# Patient Record
Sex: FEMALE | Race: WHITE | URBAN - METROPOLITAN AREA
[De-identification: names, ages, dates, MRNs, and addresses within clinical notes are randomized per-mention and may not be internally consistent; named-entity substitution may affect disease eponyms.]

---

## 2017-01-11 ENCOUNTER — TRANSFERRED RECORDS (OUTPATIENT)
Dept: HEALTH INFORMATION MANAGEMENT | Facility: CLINIC | Age: 37
End: 2017-01-11

## 2017-02-09 ENCOUNTER — CARE COORDINATION (OUTPATIENT)
Dept: GASTROENTEROLOGY | Facility: CLINIC | Age: 37
End: 2017-02-09

## 2017-02-09 NOTE — PROGRESS NOTES
Spoke with Jason, reminded her of upcoming appointment with Dr. Jane 2/23.  She is planning to attend.  Number provided if she needs to reschedule.

## 2017-02-22 ENCOUNTER — TELEPHONE (OUTPATIENT)
Dept: GASTROENTEROLOGY | Facility: CLINIC | Age: 37
End: 2017-02-22

## 2017-02-22 NOTE — TELEPHONE ENCOUNTER
Sheina left me a VM stating that she would like to reschedule her appointment with Dr. Jane as her insurance did not authorize the visit in time for her to be seen.   She knows she has been rescheduled to 05/04/2017 at 8 AM.   She inquired if she would need another procedure. I informed her this was not indicated but I will route this note to Dr. Jane as an FYI and also to see if any other F/U is needed besides clinic.     SR 02/22/2017  352pm

## 2017-06-28 ENCOUNTER — TELEPHONE (OUTPATIENT)
Dept: GASTROENTEROLOGY | Facility: CLINIC | Age: 37
End: 2017-06-28

## 2017-06-28 NOTE — TELEPHONE ENCOUNTER
Received a message stating that Sheina would like to get her clinic consult with Dr. Ugarte rescheduled, pt did no show twice in the past.  She was informed that she is scheduled on 7/20 at 11 AM.  After the phone call, I realized patient [place] of state and has [place] medicaid.   A hold was placed on Dr. Jane's schedule for 7/20 and an E-mail was sent to get patient approved to be scheduled by [employer]olia Richey, financial counselor .     SR 06/28/2017  [id number]

## 2017-06-28 NOTE — TELEPHONE ENCOUNTER
Calling pt to let her know her insurance needs to send us an authorization prior to scheduling.  Patient was informed and has been working on this.    SR 06/28/2017  [id number]

## 2017-07-07 ENCOUNTER — TELEPHONE (OUTPATIENT)
Dept: GASTROENTEROLOGY | Facility: CLINIC | Age: 37
End: 2017-07-07

## 2017-07-20 ENCOUNTER — CARE COORDINATION (OUTPATIENT)
Dept: GASTROENTEROLOGY | Facility: CLINIC | Age: 37
End: 2017-07-20

## 2017-07-20 DIAGNOSIS — K85.91 NECROTIZING PANCREATITIS: Primary | ICD-10-CM

## 2017-07-20 NOTE — PROGRESS NOTES
Jason and her family showed up to clinic today and wanted to be seen by Dr. Ugarte.   Had financial counselor talk to them, we were given approval to have them get CT scan done.   CT scan ordered and scheduled for today at 2pm. They were instructed to arrive for CT at 2pm and to be NPO for 2 hours prior. We will call them with results and then go from there.     Gabriella LYNCH RN Coordinator  Dr. Calderon, Dr. Mitchell & Dr. Ugarte   [facility]  [phone]

## 2017-07-21 ENCOUNTER — CARE COORDINATION (OUTPATIENT)
Dept: GASTROENTEROLOGY | Facility: CLINIC | Age: 37
End: 2017-07-21

## 2017-07-21 DIAGNOSIS — K21.9 GASTROESOPHAGEAL REFLUX DISEASE, ESOPHAGITIS PRESENCE NOT SPECIFIED: ICD-10-CM

## 2017-07-21 DIAGNOSIS — K85.91 NECROTIZING PANCREATITIS: ICD-10-CM

## 2017-07-21 RX ORDER — PANTOPRAZOLE SODIUM 40 MG/1
40 TABLET, DELAYED RELEASE ORAL
Qty: 90 TABLET | Refills: 4 | Status: SHIPPED | OUTPATIENT
Start: 2017-07-21 | End: 2017-07-22

## 2017-07-21 NOTE — PROGRESS NOTES
Message received from Roland Ugarte:   I called her. Thanks   I offered a clinic visit in 4 months if she could get prior authorization   Gabriella   Please refill the pancreatic enzymes and PPI       Orders placed for pancreatic enzymes and PPI.     Gabriella LYNCH RN Coordinator  Dr. Calderon, Dr. Mitchell & Dr. Ugarte   [facility]  [phone]

## 2017-07-22 RX ORDER — PANTOPRAZOLE SODIUM 40 MG/1
40 TABLET, DELAYED RELEASE ORAL
Qty: 90 TABLET | Refills: 0 | Status: SHIPPED | OUTPATIENT
Start: 2017-07-22

## 2017-07-25 NOTE — PROGRESS NOTES
Confirmed with financial counselors that we are waiting on prior authorization from [facility]. She has spoken to the patient and she is also aware we are waiting on this.   Will not be able to schedule in clinic until financial counselors give approval.     Gabriella ARELLANO

## 2018-05-30 ENCOUNTER — TELEPHONE (OUTPATIENT)
Dept: GASTROENTEROLOGY | Facility: CLINIC | Age: 38
End: 2018-05-30

## 2018-05-30 NOTE — TELEPHONE ENCOUNTER
[facility] Call Center    Phone Message    May a detailed message be left on voicemail: yes    Reason for Call: Symptoms or Concerns     If patient has red-flag symptoms, warm transfer to triage line    Current symptom or concern: increasing abd pain/nausea, swelling, low apatite,    Symptoms have been present for:  4 day(s)    Has patient previously been seen for this? Yes    By phone consult with Dr Jane    Date: 07/2017    Are there any new or worsening symptoms? Yes:    Action Taken: Message routed to:  [facility] ([facility]): [email or web address]

## 2018-05-31 NOTE — TELEPHONE ENCOUNTER
Patient was called back in regard to the below message. Patient was instructed we cannot give her medical advice as we have not seen her in over 1 year. Patient was advised to call and make an appointment or follow up with her primary care provider. The scheduling phone number [phone] option 1 was left with instructions to call to schedule an appointment.